# Patient Record
Sex: MALE | Race: WHITE | Employment: FULL TIME | URBAN - METROPOLITAN AREA
[De-identification: names, ages, dates, MRNs, and addresses within clinical notes are randomized per-mention and may not be internally consistent; named-entity substitution may affect disease eponyms.]

---

## 2017-02-19 ENCOUNTER — OFFICE VISIT (OUTPATIENT)
Dept: FAMILY MEDICINE CLINIC | Facility: CLINIC | Age: 22
End: 2017-02-19

## 2017-02-19 VITALS
SYSTOLIC BLOOD PRESSURE: 122 MMHG | OXYGEN SATURATION: 98 % | HEART RATE: 81 BPM | RESPIRATION RATE: 16 BRPM | DIASTOLIC BLOOD PRESSURE: 76 MMHG | TEMPERATURE: 99 F

## 2017-02-19 DIAGNOSIS — J02.0 STREP PHARYNGITIS: Primary | ICD-10-CM

## 2017-02-19 PROCEDURE — 99202 OFFICE O/P NEW SF 15 MIN: CPT | Performed by: NURSE PRACTITIONER

## 2017-02-19 RX ORDER — PENICILLIN V POTASSIUM 500 MG/1
500 TABLET ORAL 2 TIMES DAILY
Qty: 20 TABLET | Refills: 0 | Status: SHIPPED | OUTPATIENT
Start: 2017-02-19 | End: 2017-03-01

## 2017-02-19 NOTE — PROGRESS NOTES
CHIEF COMPLAINT:   Patient presents with:  Sore Throat      HPI:   Jo Miller is a 24year old male presents to clinic with symptoms of sore throat.  Took motrin with some improvement but symptoms persist.   Onset: 4 days ago, abrupt: yes, resolving: n THROAT: oral mucosa pink, moist. Pharyngeal injection/hyperemia: yes. Exudates: no. Gray membrane: no Tonsils 2/4. No trismus, hoarseness, muffled voice, stridor, or uvular deviation. No painful mucocutaneous ulcerations.  Palatal petechiae: no  NECK: suppl Signed Prescriptions Disp Refills    penicillin v potassium 500 MG Oral Tab 20 tablet 0      Sig: Take 1 tablet (500 mg total) by mouth 2 (two) times daily.                Patient Instructions     Pharyngitis: Strep (Confirmed)       You have had a positive · Fever as directed by your doctor   · New or worsening ear pain, sinus pain, or headache  · Painful lumps in the back of neck  · Stiff neck  · Lymph nodes are getting larger or becoming soft in the middle  · Inability to swallow liquids, excessive droolin

## (undated) NOTE — MR AVS SNAPSHOT
[address]               Thank you for choosing us for your health care visit with NICK Foster Cera.   We are glad to serve you and happy to provide you with this summary of your ulcer or GI bleeding, talk with your doctor before using these medicines.)  · Throat lozenges or sprays (such as Chloraseptic) help reduce pain. Gargling with warm salt water will also reduce throat pain.  Dissolve 1/2 teaspoon of salt in 1 glass of warm wa [address]    Hours:  24-hours Phone:  [phone]    - penicillin v potassium 500 MG Tabs            [facility]nie Num[employer]     Sign up for [employer], your secure online medical record.   G[employer]nie Num[employer] will allow you to access patient instruct Start activities slowly and build up over time Do what you like   Get your heart pumping – brisk walking, biking, swimming Even 10 minute increments are effective and add up over the week   2 ½ hours per week – spread out over time Use a jt to keep you